# Patient Record
Sex: MALE | Race: WHITE
[De-identification: names, ages, dates, MRNs, and addresses within clinical notes are randomized per-mention and may not be internally consistent; named-entity substitution may affect disease eponyms.]

---

## 2017-08-11 ENCOUNTER — HOSPITAL ENCOUNTER (EMERGENCY)
Dept: HOSPITAL 25 - ED | Age: 27
Discharge: HOME | End: 2017-08-11
Payer: COMMERCIAL

## 2017-08-11 VITALS — SYSTOLIC BLOOD PRESSURE: 122 MMHG | DIASTOLIC BLOOD PRESSURE: 73 MMHG

## 2017-08-11 DIAGNOSIS — R07.9: Primary | ICD-10-CM

## 2017-08-11 DIAGNOSIS — R55: ICD-10-CM

## 2017-08-11 DIAGNOSIS — F17.210: ICD-10-CM

## 2017-08-11 DIAGNOSIS — R06.02: ICD-10-CM

## 2017-08-11 DIAGNOSIS — R42: ICD-10-CM

## 2017-08-11 LAB
ALBUMIN SERPL BCG-MCNC: 4.4 G/DL (ref 3.2–5.2)
ALP SERPL-CCNC: 47 U/L (ref 34–104)
ALT SERPL W P-5'-P-CCNC: 19 U/L (ref 7–52)
ANION GAP SERPL CALC-SCNC: 4 MMOL/L (ref 2–11)
AST SERPL-CCNC: 17 U/L (ref 13–39)
BUN SERPL-MCNC: 12 MG/DL (ref 6–24)
BUN/CREAT SERPL: 13.3 (ref 8–20)
CALCIUM SERPL-MCNC: 9.4 MG/DL (ref 8.6–10.3)
CHLORIDE SERPL-SCNC: 104 MMOL/L (ref 101–111)
GLOBULIN SER CALC-MCNC: 2.5 G/DL (ref 2–4)
GLUCOSE SERPL-MCNC: 97 MG/DL (ref 70–100)
HCO3 SERPL-SCNC: 29 MMOL/L (ref 22–32)
HCT VFR BLD AUTO: 45 % (ref 42–52)
HGB BLD-MCNC: 15.4 G/DL (ref 14–18)
MCH RBC QN AUTO: 31 PG (ref 27–31)
MCHC RBC AUTO-ENTMCNC: 34 G/DL (ref 31–36)
MCV RBC AUTO: 91 FL (ref 80–94)
POTASSIUM SERPL-SCNC: 4.3 MMOL/L (ref 3.5–5)
PROT SERPL-MCNC: 6.9 G/DL (ref 6.4–8.9)
RBC # BLD AUTO: 4.92 10^6/UL (ref 4–5.4)
SODIUM SERPL-SCNC: 137 MMOL/L (ref 133–145)
TROPONIN I SERPL-MCNC: 0.01 NG/ML (ref ?–0.04)
WBC # BLD AUTO: 10.4 10^3/UL (ref 3.5–10.8)

## 2017-08-11 PROCEDURE — 84484 ASSAY OF TROPONIN QUANT: CPT

## 2017-08-11 PROCEDURE — 80307 DRUG TEST PRSMV CHEM ANLYZR: CPT

## 2017-08-11 PROCEDURE — 93005 ELECTROCARDIOGRAM TRACING: CPT

## 2017-08-11 PROCEDURE — 99282 EMERGENCY DEPT VISIT SF MDM: CPT

## 2017-08-11 PROCEDURE — 83605 ASSAY OF LACTIC ACID: CPT

## 2017-08-11 PROCEDURE — 71010: CPT

## 2017-08-11 PROCEDURE — 80053 COMPREHEN METABOLIC PANEL: CPT

## 2017-08-11 PROCEDURE — 85025 COMPLETE CBC W/AUTO DIFF WBC: CPT

## 2017-08-11 PROCEDURE — 36415 COLL VENOUS BLD VENIPUNCTURE: CPT

## 2017-08-11 NOTE — RAD
HISTORY: Chest pain



COMPARISONS: None



VIEWS:1: Single frontal portable view of the chest at 11:05 AM



FINDINGS:

LINES AND TUBES: None.

CARDIOMEDIASTINAL SILHOUETTE: The cardiomediastinal silhouette is normal for portable

technique.

PLEURA: The costophrenic angles are sharp. No pleural abnormalities are noted.

LUNG PARENCHYMA: The lungs are clear.

ABDOMEN: The upper abdomen is clear. There is no subphrenic gas.

BONES AND SOFT TISSUES: No bone or soft tissue abnormalities are noted.



IMPRESSION: NO ACTIVE CARDIOPULMONARY DISEASE.

## 2017-08-24 NOTE — ED
Richard CONNELLY Benjamin, scribed for Glen Mascorro MD on 08/11/17 at 1108 .





HPI Chest Pain





- HPI Summary


HPI Summary: 





26yo male c/o sudden onset chest pain that started this morning around 9am. Pt 

describes feeling heaviness in his left anterior chest that lasted about an 

hour. Pt was just walking around his house at the time of onset. Pt reports 

having previous CP every once in a while but never this bad. Associated 

symptoms include SOB, lightheadedness, and pt also felt like passing out. 

Denies cough and has not been on a long trip recently. Negative FHx of CAD and 

DM. Pt is a smoker. Pt experienced a family death recently but denies any 

anxiety or stress.





- History of Current Complaint


Chief Complaint: EDChestPainROMI


Time Seen by Provider: 08/11/17 10:44


Hx Obtained From: Patient, Family/Caretaker - wife


Onset/Duration: Still Present


Time of Onset: 09:00


Timing: Constant, Lasting Hours - 1 hr


Initial Severity: Moderate


Current Severity: Mild


Pain Intensity: 2


Pain Scale Used: 0-10 Numeric


Chest Pain Location: Left Anterior


Chest Pain Radiates: No


Character: Heaviness


Aggravating Factor(s): Nothing


Alleviating Factor(s): Nothing


Associated Signs and Symptoms: Positive: Chest Pain, Shortness of Breath, 

Syncope - near, Lightheadedness.  Negative: Anxiety, Recent Stress, Cough, 

Productive Cough, Nonproductive Cough





- Allergy/Home Medications


Allergies/Adverse Reactions: 


 Allergies











Allergy/AdvReac Type Severity Reaction Status Date / Time


 


No Known Allergies Allergy   Verified 08/11/17 10:45














PMH/Surg Hx/FS Hx/Imm Hx


Cardiovascular History: 


   Denies: Hx Myocardial Infarction


Infectious Disease History: No


Infectious Disease History: 


   Denies: Traveled Outside the [place] in Last 30 Days





- Family History


Known Family History: 


   Negative: Cardiac Disease, Hypertension





- Social History


Occupation: Employed Full-time


Lives: With Family


Alcohol Use: None


Substance Use Type: Reports: Marijuana


Smoking Status (MU): Current Every Day Smoker





Review of Systems


Negative: Fever, Chills


Negative: Erythema


Negative: Sore Throat


Positive: Chest Pain - L anterior


Positive: Shortness Of Breath.  Negative: Cough


Negative: Abdominal Pain, Vomiting, Nausea


Negative: dysuria, hematuria


Negative: Edema


Negative: Rash


Neurological: Other - lightheadedness


Positive: Syncope - near 


Psychological: Normal


Negative: Anxious


All Other Systems Reviewed And Are Negative: Yes





Physical Exam


Triage Information Reviewed: Yes


Vital Signs On Initial Exam: 


 Initial Vitals











Temp Pulse Resp BP


 


 97.6 F   67   20   123/77 


 


 08/11/17 10:34  08/11/17 10:34  08/11/17 10:34  08/11/17 10:34











Vital Signs Reviewed: Yes


Appearance: Positive: Well-Appearing, Well-Nourished, Pain Distress - mild


Skin: Positive: Warm, Skin Color Reflects Adequate Perfusion


Head/Face: Positive: Normal Head/Face Inspection


Eyes: Positive: Conjunctiva Clear


ENT: Positive: Normal ENT inspection


Neck: Positive: Supple, Nontender, Other: - (-) JVD, (-) Stridor, (-) Tracheal 

deviation


Respiratory/Lung Sounds: Positive: Clear to Auscultation, Breath Sounds 

Present.  Negative: Rales, Rhonchi, Wheezes


Cardiovascular: Positive: RRR, Pulses are Symmetrical in both Upper and Lower 

Extremities.  Negative: Murmur


Abdomen Description: Positive: Nontender, Soft, Other: - negative - rebound.  

Negative: Distended, Guarding


Musculoskeletal: Negative: Edema Left, Edema Right


Neurological: Positive: Sensory/Motor Intact, Alert, Oriented to Person Place, 

Time


Psychiatric: Positive: Affect/Mood Appropriate





Diagnostics





- Vital Signs


 Vital Signs











  Temp Pulse Resp BP Pulse Ox


 


 08/11/17 10:41  98.7 F  66  16  145/83  96


 


 08/11/17 10:34  97.6 F  67  20  123/77 














- Laboratory


Result Diagrams: 


 08/11/17 10:50





 08/11/17 10:50


Lab Statement: Any lab studies that have been ordered have been reviewed, and 

results considered in the medical decision making process.





- Radiology


  ** CXR


Xray Interpretation: No Acute Changes


Radiology Interpretation Completed By: Radiologist





- EKG


  ** 1040.


Cardiac Rate: NL - 67bpm


EKG Rhythm: Sinus Rhythm


EKG Interpretation: No STEMI, mild ST elevation in inferior leads 





  ** 1358.


Cardiac Rate: NL - 64bpm


EKG Rhythm: Sinus Rhythm


EKG Comparison: No Significant Change - unchanged from EKG taken at PCP's 

office at 4/20/17.





Re-Evaluation





- Re-Evaluation


  ** First Eval


Re-Evaluation Time: 13:10


Change: Unchanged - still chest pain free.





  ** Second Eval


Re-Evaluation Time: 15:02


Change: Unchanged - still chest pain free.





Chest Pain Course/Dx





- Course


Course Of Treatment: Reviewed pts medication and allergy lists.





- Diagnoses


Provider Diagnoses: 


 Chest pain








Discharge





- Discharge Plan


Condition: Stable


Disposition: HOME


Patient Education Materials:  Chest Pain (ED)


Referrals: 


Session Chago POLANCO [Primary Care Provider] - 


Mook Stanton MD [Medical Doctor] - 2 Days


Additional Instructions: 


RETURN TO THE EMERGENCY DEPARTMENT FOR CHANGING OR WORSENING SYMPTOMS.





The documentation as recorded by the Richard merritt Benjamin accurately reflects 

the service I personally performed and the decisions made by Ludwin arrieta Jerry, MD.

## 2018-01-30 ENCOUNTER — HOSPITAL ENCOUNTER (EMERGENCY)
Dept: HOSPITAL 25 - ED | Age: 28
Discharge: HOME | End: 2018-01-30
Payer: COMMERCIAL

## 2018-01-30 VITALS — SYSTOLIC BLOOD PRESSURE: 126 MMHG | DIASTOLIC BLOOD PRESSURE: 97 MMHG

## 2018-01-30 DIAGNOSIS — R11.2: ICD-10-CM

## 2018-01-30 DIAGNOSIS — R10.9: ICD-10-CM

## 2018-01-30 DIAGNOSIS — R06.02: ICD-10-CM

## 2018-01-30 DIAGNOSIS — R05: ICD-10-CM

## 2018-01-30 DIAGNOSIS — R50.9: Primary | ICD-10-CM

## 2018-01-30 LAB
BASOPHILS # BLD AUTO: 0 10^3/UL (ref 0–0.2)
EOSINOPHIL # BLD AUTO: 0.1 10^3/UL (ref 0–0.6)
HCT VFR BLD AUTO: 41 % (ref 42–52)
HGB BLD-MCNC: 14.1 G/DL (ref 14–18)
LYMPHOCYTES # BLD AUTO: 0.5 10^3/UL (ref 1–4.8)
MCH RBC QN AUTO: 31 PG (ref 27–31)
MCHC RBC AUTO-ENTMCNC: 35 G/DL (ref 31–36)
MCV RBC AUTO: 89 FL (ref 80–94)
MONOCYTES # BLD AUTO: 0.7 10^3/UL (ref 0–0.8)
NEUTROPHILS # BLD AUTO: 4.4 10^3/UL (ref 1.5–7.7)
NRBC # BLD AUTO: 0 10^3/UL
NRBC BLD QL AUTO: 0
PLATELET # BLD AUTO: 229 10^3/UL (ref 150–450)
RBC # BLD AUTO: 4.6 10^6/UL (ref 4–5.4)
WBC # BLD AUTO: 5.7 10^3/UL (ref 3.5–10.8)

## 2018-01-30 PROCEDURE — 80053 COMPREHEN METABOLIC PANEL: CPT

## 2018-01-30 PROCEDURE — 99283 EMERGENCY DEPT VISIT LOW MDM: CPT

## 2018-01-30 PROCEDURE — 36415 COLL VENOUS BLD VENIPUNCTURE: CPT

## 2018-01-30 PROCEDURE — 96375 TX/PRO/DX INJ NEW DRUG ADDON: CPT

## 2018-01-30 PROCEDURE — 96374 THER/PROPH/DIAG INJ IV PUSH: CPT

## 2018-01-30 PROCEDURE — 85025 COMPLETE CBC W/AUTO DIFF WBC: CPT

## 2018-01-30 RX ADMIN — SODIUM CHLORIDE ONE MLS/HR: 900 IRRIGANT IRRIGATION at 18:11

## 2018-01-30 NOTE — ED
HPI Febrile Illness





- HPI Summary


HPI Summary: 


28-year-old male presents with fever since yesterday.  He denies body aches, 

headache, congestion, and sore throat.  She denies any neck stiffness.  He 

denies any photophobia.  He has history of migraines.  He admits to nausea and 

vomiting. He admits to generalized abdominal pain.  He admits to a dry cough.  

Admits to occasional shortness of breath.  He denies any chest pain. He admits 

to generalized body aches.  He hasn't taken anything for his pain.  He states 

he cannot keep anything down.  He was last night.  He has no medical conditions.








- History of Current Complaint


Chief Complaint: EDFluSymptoms


Time Seen by Provider: 01/30/18 17:45


Pain Intensity: 5





- Allergy/Home Medications


Allergies/Adverse Reactions: 


 Allergies











Allergy/AdvReac Type Severity Reaction Status Date / Time


 


No Known Allergies Allergy   Verified 08/11/17 10:45














PMH/Surg Hx/FS Hx/Imm Hx


Endocrine/Hematology History: 


   Denies: Hx Anticoagulant Therapy


Cardiovascular History: 


   Denies: Hx Myocardial Infarction


Respiratory History: 


   Denies: Hx Asthma


Infectious Disease History: No


Infectious Disease History: 


   Denies: Traveled Outside the [place] in Last 30 Days





- Family History


Known Family History: 


   Negative: Cardiac Disease, Hypertension





- Social History


Alcohol Use: None


Substance Use Type: Reports: None


Smoking Status (MU): Never Smoked Tobacco





Review of Systems


Positive: Fever, Chills, Fatigue


Negative: Chest Pain


Positive: Shortness Of Breath, Cough


Positive: Abdominal Pain, Vomiting, Nausea.  Negative: Diarrhea


All Other Systems Reviewed And Are Negative: Yes





Physical Exam


Triage Information Reviewed: Yes


Vital Signs On Initial Exam: 


 Initial Vitals











Temp Pulse Resp BP Pulse Ox


 


 99.6 F   110   20   120/83   97 


 


 01/30/18 16:20  01/30/18 16:20  01/30/18 16:20  01/30/18 16:20  01/30/18 16:20











Vital Signs Reviewed: Yes


Appearance: Positive: Ill-Appearing - non toxic


Skin: Positive: Warm, Dry


Head/Face: Positive: Normal Head/Face Inspection


Eyes: Positive: Normal, EOMI, GANESH, Conjunctiva Clear


ENT: Positive: Normal ENT inspection, Pharyngeal erythema, TMs normal, Uvula 

midline.  Negative: Tonsillar swelling, Tonsillar exudate, Trismus, Muffled 

voice


Respiratory/Lung Sounds: Positive: Clear to Auscultation, Breath Sounds Present


Cardiovascular: Positive: Normal, RRR


Abdomen Description: Positive: Nontender, Soft


Bowel Sounds: Positive: Present


Musculoskeletal: Positive: Normal


Neurological: Positive: Normal


Psychiatric: Positive: Normal





Diagnostics





- Vital Signs


 Vital Signs











  Temp Pulse Resp BP Pulse Ox


 


 01/30/18 18:00     128/88 


 


 01/30/18 17:54   94    96


 


 01/30/18 17:52  100.2 F    


 


 01/30/18 17:51     117/56 


 


 01/30/18 16:20  99.6 F  110  20  120/83  97














- Laboratory


Result Diagrams: 


 01/30/18 18:15





 01/30/18 18:15


Lab Statement: Any lab studies that have been ordered have been reviewed, and 

results considered in the medical decision making process.





Re-Evaluation





- Re-Evaluation


  ** First Eval


Re-Evaluation Time: 19:34


Change: Improved


Comment: still has a headache, so will give benadryl to complete migraine 

cocktail





Course/Dx





- Course


Course Of Treatment: 28-year-old male presents with fever since yesterday.  He 

denies body aches, headache, congestion, and sore throat.  She denies any neck 

stiffness.  He denies any photophobia.  He has history of migraines.  He admits 

to nausea and vomiting.  He admits to a dry cough.  Admits to occasional 

shortness of breath.  He denies any chest pain. He admits to generalized body 

aches.  He hasn't taken anything for his pain.  He states he cannot keep 

anything down.  He was last night.  He has no medical conditions.  On exam 

appears normal.  Pharynx erythema but no tonsillar swelling or exudate.  Uvula 

midline.  Lungs clear to auscultation.  Abdomen soft nontender. will treat 

presumpatively as flu with tamiflu. will give zofran for nausea. patient 

understand and agrees with plan.





- Febrile Illness


Differential Diagnoses: Pneumonia, Viremia, Other: - influenza





- Diagnoses


Provider Diagnoses: 


 Febrile illness








Discharge





- Discharge Plan


Condition: Good


Disposition: HOME


Prescriptions: 


Ondansetron ODT TAB* [Zofran 4 MG Odt TAB*] 4 mg PO Q6H PRN #20 tab.odt


 PRN Reason: Nausea


Oseltamivir CAP* [Tamiflu CAP*] 75 mg PO BID #9 cap


Patient Education Materials:  Influenza (ED)


Referrals: 


Session Chago POLANCO [Primary Care Provider] - 


Additional Instructions: 


Take Tamiflu twice for 5 days first dose given in ED


Take Tylenol and ibuprofen for muscle aches and fever every 6 hours


Use Zofran every 6 hours for nausea as needed


Saline rinse can be used multiple times a day for nasal congestion


Use humidifier in room or place bowls of warm water around room for cough


Try to drink fluids every hour and eat a small snack every 3 hours


Return to ED if develop any new or worsening symptoms